# Patient Record
Sex: MALE | Race: WHITE | NOT HISPANIC OR LATINO | Employment: FULL TIME | URBAN - METROPOLITAN AREA
[De-identification: names, ages, dates, MRNs, and addresses within clinical notes are randomized per-mention and may not be internally consistent; named-entity substitution may affect disease eponyms.]

---

## 2023-04-26 ENCOUNTER — TELEPHONE (OUTPATIENT)
Dept: PAIN MEDICINE | Facility: CLINIC | Age: 43
End: 2023-04-26

## 2023-04-26 NOTE — TELEPHONE ENCOUNTER
I spoke with Jose M at [facility] (ext [phone]) to confirm if PIP was open and funds were available. She stated yes. Ref# for the call is [id number]. Ok to schedule as new patient with Dr Mcgrath.